# Patient Record
Sex: FEMALE | Race: WHITE
[De-identification: names, ages, dates, MRNs, and addresses within clinical notes are randomized per-mention and may not be internally consistent; named-entity substitution may affect disease eponyms.]

---

## 2023-10-25 ENCOUNTER — HOSPITAL ENCOUNTER (OUTPATIENT)
Dept: PHYSICAL THERAPY | Facility: CLINIC | Age: 23
Setting detail: THERAPIES SERIES
Discharge: HOME | End: 2023-10-25

## 2023-10-25 NOTE — FLOWSHEET NOTE
[] [address]  [address] [facility].  [phone]  F: [phone] [] [address] [facility]  [address]   [address]  P: [phone]  F: [phone] [x] [address]  [address] [facility]  P: [phone]  F: [phone] [] [facility] [place]: [phone]  F: [phone] [] [address] [facility]  [address] [facility]   [address]   P: [phone]  F: [phone]  [] [address] [facility].   P: [phone]  F: [phone] [] [address] [facility]  [address] [facility]  P: [phone]  F: [phone] [] [address] [facility]  [address] [facility]  [place]: [phone]  F: [phone] [] [facility][note 3][address]  [place]: [phone]  F: [phone]      Therapy Cancel/No Show note    Date: 10/25/2023  Patient: Sigifredo Mars  : 2000  MRN: [id number]    Cancels/No Shows to date:     For today's appointment patient:    [x]  Cancelled    [] Rescheduled appointment    [] No-show     Reason given by patient:    []  Patient ill    []  Conflicting appointment    [] No transportation      [] Conflict with work    [] No reason given    [] Weather related    [] [id number]    [x] Other:   Financial reasons   Comments:Per desk staff, patient arrived, but declined therapy for financial reasons.         [] Next appointment was confirmed    Electronically signed by: Edi Giraldo PT